# Patient Record
Sex: FEMALE | Race: WHITE
[De-identification: names, ages, dates, MRNs, and addresses within clinical notes are randomized per-mention and may not be internally consistent; named-entity substitution may affect disease eponyms.]

---

## 2021-07-22 ENCOUNTER — HOSPITAL ENCOUNTER (EMERGENCY)
Dept: HOSPITAL 41 - JD.ED | Age: 66
Discharge: HOME | End: 2021-07-22
Payer: COMMERCIAL

## 2021-07-22 DIAGNOSIS — E66.9: ICD-10-CM

## 2021-07-22 DIAGNOSIS — N39.0: Primary | ICD-10-CM

## 2021-07-22 DIAGNOSIS — Z79.899: ICD-10-CM

## 2021-07-22 DIAGNOSIS — I10: ICD-10-CM

## 2021-07-22 DIAGNOSIS — Z88.8: ICD-10-CM

## 2021-07-22 DIAGNOSIS — J45.909: ICD-10-CM

## 2021-07-22 DIAGNOSIS — Z91.048: ICD-10-CM

## 2021-07-22 PROCEDURE — 87186 SC STD MICRODIL/AGAR DIL: CPT

## 2021-07-22 PROCEDURE — 80053 COMPREHEN METABOLIC PANEL: CPT

## 2021-07-22 PROCEDURE — 85025 COMPLETE CBC W/AUTO DIFF WBC: CPT

## 2021-07-22 PROCEDURE — 86140 C-REACTIVE PROTEIN: CPT

## 2021-07-22 PROCEDURE — 96375 TX/PRO/DX INJ NEW DRUG ADDON: CPT

## 2021-07-22 PROCEDURE — 99284 EMERGENCY DEPT VISIT MOD MDM: CPT

## 2021-07-22 PROCEDURE — 87088 URINE BACTERIA CULTURE: CPT

## 2021-07-22 PROCEDURE — 87086 URINE CULTURE/COLONY COUNT: CPT

## 2021-07-22 PROCEDURE — 36415 COLL VENOUS BLD VENIPUNCTURE: CPT

## 2021-07-22 PROCEDURE — 81001 URINALYSIS AUTO W/SCOPE: CPT

## 2021-07-22 PROCEDURE — 96374 THER/PROPH/DIAG INJ IV PUSH: CPT

## 2021-07-22 PROCEDURE — 83690 ASSAY OF LIPASE: CPT

## 2021-07-22 PROCEDURE — 74176 CT ABD & PELVIS W/O CONTRAST: CPT

## 2021-07-22 NOTE — CT
CT abdomen and pelvis

 

Technique: Multiple axial sections were obtained from above the dome 

of the diaphragm inferiorly through the pubic symphysis.  Intravenous 

and oral contrast were not utilized.  Study has been performed as a 

ureteral stone protocol.

 

Comparison: No prior abdomen and pelvis imaging is available.

 

Findings: Multiple parapelvic cysts are seen within both kidneys, 

worse on the left side.  There are two nonobstructing calculi being 

seen within the left kidney with largest calculus measuring 7 mm.  Two

 small calculi are noted within the right kidney with largest 

measuring 4 mm.  No ureteral dilatation or ureteral stone is seen.  No

 bladder calculi are seen.

 

Visualized lung bases show nothing acute.

 

Numerous cysts are seen within the right and left lobes of the liver. 

 Largest cyst is located within the right lobe of the liver measuring 

7.1 cm.

 

Gallbladder contains no calcified gallstones.  Adrenal glands show no 

nodule.

 

Pancreas appears within normal limits.

 

Abdominal aorta shows atherosclerotic calcification which continues 

into the iliac arteries.  No aneurysm is seen.  No retroperitoneal 

adenopathy or mesenteric abnormalities are seen.  No pelvic mass or 

adenopathy is seen.

 

Appendix is not visualized with certainty. There is evidence of fecal 

material within distal small bowel loops in the right lower quadrant. 

 No bowel dilatation is seen in this area.

 

Bone window settings were reviewed which show degenerative change 

within the lumbar spine.  There is fusion at L5-S1 being seen with 

spondylolisthesis measuring 1.3 cm.

 

Impression:

1.  Numerous liver cysts as well as multiple parapelvic cysts within 

both kidneys.  Parapelvic cysts are more prominent within the left 

kidney.  Two nonobstructing calculi are seen within each kidney.  No 

ureteral dilatation or ureteral calculi are is seen.

2.  Other findings as noted above which are chronic.  Nothing acute is

 appreciated.

 

Diagnostic code #2

## 2021-07-22 NOTE — EDM.PDOC
ED HPI GENERAL MEDICAL PROBLEM





- General


Chief Complaint: Abdominal Pain


Stated Complaint: NAUSEA/ABDOMINAL PAIN


Time Seen by Provider: 21 17:12


Source of Information: Reports: Patient, RN Notes Reviewed


History Limitations: Reports: No Limitations





- History of Present Illness


INITIAL COMMENTS - FREE TEXT/NARRATIVE: 





Patient is a 65-year-old female presenting to the emergency department with 

complaints of right-sided flank and abdominal pain that started around 10 AM 

this morning.  She reports the pain is constant with occasional "cramping ".  

She is felt nauseous but has had no vomiting.  Denies diarrhea.  States that she

thought she could be constipated but did have 2 small bowel movements this 

morning.  She has had kidney stones in the past but states that this feels 

different than before.  Denies any dysuria or obvious hematuria.


  ** Right Lower Abdomen


Pain Score (Numeric/FACES): 2





- Related Data


                                    Allergies











Allergy/AdvReac Type Severity Reaction Status Date / Time


 


animal dander Allergy Severe Airway Verified 21 17:18





   Tightness  


 


tree and shrub pollen Allergy Severe Airway Verified 21 17:18





   Tightness  


 


lisinopril AdvReac Severe Cough Verified 21 17:18


 


Statins-Hmg-Coa Reductase AdvReac Severe Muscle Verified 21 17:18





Inhibitor   Aches  


 


hay fever Allergy Severe Airway Uncoded 21 17:18





   Tightness  











Home Meds: 


                                    Home Meds





Albuterol Sulfate [Proair Hfa] 1 - 2 puff INH Q4H PRN 10/21/20 [History]


Ascorbic Acid [Vitamin C] 250 mg PO DAILY 10/21/20 [History]


Ezetimibe [Zetia] 10 mg PO DAILY 10/21/20 [History]


Fish Oil/Omega-3 Fatty Acids [Fish Oil 1,000 MG] 500 mg PO Q48H 10/21/20 

[History]


Loratadine [Claritin] 10 mg PO DAILY PRN 10/21/20 [History]


Multivitamin 1 tab PO DAILY 10/21/20 [History]


Ubidecarenone [Coq-10] 100 mg PO DAILY 10/21/20 [History]


amLODIPine Besylate [Norvasc] 10 mg PO DAILY 10/21/20 [History]


Calcium Carbonate [Calcium] 250 mg PO DAILY 21 [History]


Cefdinir [Omnicef] 300 mg PO BID 7 Days #14 cap 21 [Rx]


L.acidoph,Paracasei, B.lactis [Probiotic] 1 each PO DAILY 21 [History]


Zolpidem Tartrate [Ambien] 5 mg PO BEDTIME PRN 21 [History]











Past Medical History


HEENT History: Reports: Hard of Hearing, Impaired Vision, Other (See Below)


Other HEENT History: TMJ with surgical intervention, wears glasses, has hearing 

aids


Cardiovascular History: Reports: High Cholesterol, Hypertension


Respiratory History: Reports: Asthma


Gastrointestinal History: Reports: Colon Polyp, GERD, Other (See Below)


Other Gastrointestinal History: esophagitis


Genitourinary History: Reports: UTI, Recurrent


Other Genitourinary History: UTI


OB/GYN History: Reports: None


Musculoskeletal History: Reports: Arthritis


Neurological History: Reports: None


Psychiatric History: Reports: None


Endocrine/Metabolic History: Reports: Obesity/BMI 30+


Hematologic History: Reports: None


Immunologic History: Reports: None


Oncologic (Cancer) History: Reports: None


Dermatologic History: Reports: None





- Past Surgical History


HEENT Surgical History: Reports: Naso-Sinus Surgery, Other (See Below)


Other HEENT Surgeries/Procedures: TMJ Surgery


GI Surgical History: Reports: Colonoscopy, EGD


Female  Surgical History: Reports:  Section, Hysterectomy, 

Oophorectomy


Musculoskeletal Surgical History: Reports: Shoulder Surgery





Social & Family History





- Tobacco Use


Tobacco Use Status *Q: Never Tobacco User





- Caffeine Use


Caffeine Use: Reports: Coffee





- Recreational Drug Use


Recreational Drug Use: No





ED ROS GENERAL





- Review of Systems


Review Of Systems: See Below


Constitutional: Reports: No Symptoms.  Denies: Fever, Chills


HEENT: Reports: No Symptoms


Respiratory: Reports: No Symptoms


Cardiovascular: Reports: No Symptoms


Endocrine: Reports: No Symptoms


GI/Abdominal: Reports: Abdominal Pain, Nausea.  Denies: Diarrhea, Vomiting


: Reports: No Symptoms.  Denies: Dysuria, Hematuria


Musculoskeletal: Reports: No Symptoms


Skin: Reports: No Symptoms


Neurological: Reports: No Symptoms


Psychiatric: Reports: No Symptoms


Hematologic/Lymphatic: Reports: No Symptoms


Immunologic: Reports: No Symptoms





ED EXAM, GI/ABD





- Physical Exam


Exam: See Below


Exam Limited By: No Limitations


General Appearance: Alert, WD/WN, No Apparent Distress


Respiratory/Chest: No Respiratory Distress, Lungs Clear, Normal Breath Sounds, 

No Accessory Muscle Use, Chest Non-Tender


Cardiovascular: Normal Peripheral Pulses, Regular Rate, Rhythm, No Edema, No 

Gallop, No JVD, No Murmur, No Rub


GI/Abdominal Exam: Normal Bowel Sounds, Soft, No Organomegaly, No Distention, No

 Abnormal Bruit, No Mass, Pelvis Stable, Tender (RLQ and right lateral abdomen)


Back Exam: Normal Inspection, Full Range of Motion, CVA Tenderness (R).  No: CVA

 Tenderness (L)


Neurological: Alert, Oriented, CN II-XII Intact, Normal Cognition, Normal Gait, 

Normal Reflexes, No Motor/Sensory Deficits


Psychiatric: Normal Affect, Normal Mood


Skin Exam: Warm, Dry, Intact, Normal Color, No Rash





Course





- Vital Signs


Last Recorded V/S: 





                                Last Vital Signs











Temp  96.9 F   21 17:14


 


Pulse  86   21 17:14


 


Resp  16   21 17:14


 


BP  161/88 H  21 17:14


 


Pulse Ox  96   21 17:14














- Orders/Labs/Meds


Orders: 





                               Active Orders 24 hr











 Category Date Time Status


 


 Peripheral IV Care [RC] .AS DIRECTED Care  21 17:29 Active


 


 CULTURE URINE [MREF] Stat Lab  21 17:40 Ordered


 


 Sodium Chloride 0.9% [Normal Saline] 1,000 ml Med  21 17:31 Active





 IV NOW   


 


 Sodium Chloride 0.9% [Saline Flush] Med  21 17:29 Active





 10 ml FLUSH ASDIRECTED PRN   


 


 Peripheral IV Insertion Adult [OM.PC] Stat Oth  21 17:29 Ordered








                                Medication Orders





Sodium Chloride (Normal Saline)  1,000 mls @ 150 mls/hr IV NOW STA


   Stop: 21 00:10


   Last Admin: 21 18:04  Dose: 150 mls/hr


   Documented by: JG


Sodium Chloride (Sodium Chloride 0.9% 10 Ml Syringe)  10 ml FLUSH ASDIRECTED PRN


   PRN Reason: Keep Vein Open


   Last Admin: 21 17:58  Dose: 10 ml


   Documented by: JG








Labs: 





                                Laboratory Tests











  21 Range/Units





  17:40 17:58 17:58 


 


WBC   9.96   (3.98-10.04)  K/mm3


 


RBC   5.10   (3.98-5.22)  M/mm3


 


Hgb   14.9   (11.2-15.7)  gm/dl


 


Hct   44.6   (34.1-44.9)  %


 


MCV   87.5   (79.4-94.8)  fl


 


MCH   29.2   (25.6-32.2)  pg


 


MCHC   33.4   (32.2-35.5)  g/dl


 


RDW Std Deviation   42.7   (36.4-46.3)  fL


 


Plt Count   368   (182-369)  K/mm3


 


MPV   9.5   (9.4-12.3)  fl


 


Neut % (Auto)   69.7   (34.0-71.1)  %


 


Lymph % (Auto)   21.5   (19.3-51.7)  %


 


Mono % (Auto)   7.2   (4.7-12.5)  %


 


Eos % (Auto)   1.3   (0.7-5.8)  


 


Baso % (Auto)   0.2   (0.1-1.2)  %


 


Neut # (Auto)   6.94 H   (1.56-6.13)  K/mm3


 


Lymph # (Auto)   2.14   (1.18-3.74)  K/mm3


 


Mono # (Auto)   0.72 H   (0.24-0.36)  K/mm3


 


Eos # (Auto)   0.13   (0.04-0.36)  K/mm3


 


Baso # (Auto)   0.02   (0.01-0.08)  K/mm3


 


Sodium    141  (136-145)  mEq/L


 


Potassium    4.1  (3.5-5.1)  mEq/L


 


Chloride    106  ()  mEq/L


 


Carbon Dioxide    25  (21-32)  mEq/L


 


Anion Gap    14.1  (5-15)  


 


BUN    19 H  (7-18)  mg/dL


 


Creatinine    1.2 H  (0.55-1.02)  mg/dL


 


Est Cr Clr Drug Dosing    39.28  mL/min


 


Estimated GFR (MDRD)    45  (>60)  mL/min


 


BUN/Creatinine Ratio    15.8  (14-18)  


 


Glucose    112 H  (70-99)  mg/dL


 


Calcium    9.2  (8.5-10.1)  mg/dL


 


Total Bilirubin    0.3  (0.2-1.0)  mg/dL


 


AST    14 L  (15-37)  U/L


 


ALT    22  (14-59)  U/L


 


Alkaline Phosphatase    68  ()  U/L


 


C-Reactive Protein    <0.2  (<1.0)  mg/dL


 


Total Protein    7.3  (6.4-8.2)  g/dl


 


Albumin    3.9  (3.4-5.0)  g/dl


 


Globulin    3.4  gm/dL


 


Albumin/Globulin Ratio    1.2  (1-2)  


 


Lipase    84  ()  U/L


 


Urine Color  Light yellow    (Yellow)  


 


Urine Appearance  Clear    (Clear)  


 


Urine pH  6.0    (5.0-8.0)  


 


Ur Specific Gravity  1.020    (1.005-1.030)  


 


Urine Protein  Negative    (Negative)  


 


Urine Glucose (UA)  Negative    (Negative)  


 


Urine Ketones  Negative    (Negative)  


 


Urine Occult Blood  Negative    (Negative)  


 


Urine Nitrite  Negative    (Negative)  


 


Urine Bilirubin  Negative    (Negative)  


 


Urine Urobilinogen  0.2    (0.2-1.0)  


 


Ur Leukocyte Esterase  2+ H    (Negative)  


 


Urine RBC  0-5    (0-5)  /hpf


 


Urine WBC  5-10 H    (0-5)  /hpf


 


Ur Epithelial Cells  0-5    (0-5)  /hpf


 


Urine Bacteria  Rare    (FEW)  /hpf


 


Urine Mucus  Rare    (FEW)  /hpf











Meds: 





Medications











Generic Name Dose Route Start Last Admin





  Trade Name Freq  PRN Reason Stop Dose Admin


 


Sodium Chloride  1,000 mls @ 150 mls/hr  21 17:31  21 18:04





  Normal Saline  IV  21 00:10  150 mls/hr





  NOW STA   Administration


 


Sodium Chloride  10 ml  21 17:29  21 17:58





  Sodium Chloride 0.9% 10 Ml Syringe  FLUSH   10 ml





  ASDIRECTED PRN   Administration





  Keep Vein Open  














Discontinued Medications














Generic Name Dose Route Start Last Admin





  Trade Name Freq  PRN Reason Stop Dose Admin


 


Hydromorphone HCl  0.5 mg  21 17:31  21 18:02





  Hydromorphone 0.5 Mg/0.5 Ml Syringe  IVPUSH  21 17:32  0.5 mg





  ONETIME ONE   Administration


 


Ondansetron HCl  4 mg  21 17:31  21 18:00





  Ondansetron 4 Mg/2 Ml Sdv  IVPUSH  21 17:32  4 mg





  ONETIME ONE   Administration














- Re-Assessments/Exams


Free Text/Narrative Re-Assessment/Exam: 


Patient is a 65-year-old female presenting to the emergency department with 

complaints of right lower quadrant and right lateral abdominal pain as well as 

right flank pain.  On exam, patient does have right lower quadrant and right 

lateral abdominal tenderness as well as right-sided flank pain.  Exam is 

suspicious for possible kidney stone.  I have ordered blood work, urinalysis, 

and a CT scan of the abdomen pelvis without contrast.  We will give her IV 

fluids, Dilaudid, and Zofran.





21 19:10


Hematology is grossly unremarkable.  Urinalysis shows 2+ leukocyte esterase and 

10-20 WBCs consistent with urinary tract infection.  CT scan shows numerous 

cysts as well as nonobstructing kidney stones but no ureteral dilatation or 

ureteral calculi.  Review of the CT does show increased stool in the area of her

 discomfort.  Patient will be treated for urinary tract infection with Omnicef. 

 This prescription will be sent to her pharmacy.  She will be sent home with a 

bottle of magnesium citrate.  Discussed return precautions.  Discharge 

instructions as documented.





Departure





- Departure


Time of Disposition: 19:10


Disposition: Home, Self-Care 01


Condition: Good


Clinical Impression: 


Urinary tract infection


Qualifiers:


 Urinary tract infection type: site unspecified Hematuria presence: without 

hematuria Qualified Code(s): N39.0 - Urinary tract infection, site not specified








- Discharge Information


*PRESCRIPTION DRUG MONITORING PROGRAM REVIEWED*: No


*COPY OF PRESCRIPTION DRUG MONITORING REPORT IN PATIENT KALIN: No


Prescriptions: 


Cefdinir [Omnicef] 300 mg PO BID 7 Days #14 cap


Instructions:  Urinary Tract Infection, Adult


Referrals: 


Zulema Martinez MD [Primary Care Provider] - 


Additional Instructions: 


You were seen in the emergency department today for right-sided abdominal and 

back pain.  Work-up included blood work, urinalysis, and a CT scan of your 

abdomen pelvis.  Blood work was found to be normal.  Urinalysis did show that 

you have a urinary tract infection.  CT scan showed increased stool in the right

side of your colon as well as numerous cysts in your liver and kidneys.  Was 

otherwise found to be normal.  You have been started on Omnicef for treatment of

urinary tract infection.  Take this medication as prescribed.  You been sent 

home with a bottle of magnesium citrate which is a laxative.  Recommend that you

drink that upon your arrival home.  This should produce a number of bowel 

movements, some of which would be loose.  If symptoms fail to improve or you 

develop any worsening symptoms, please not hesitate to return to the emergency 

department for reevaluation.





Sepsis Event Note (ED)





- Evaluation


Sepsis Screening Result: No Definite Risk





- Focused Exam


Vital Signs: 





                                   Vital Signs











  Temp Pulse Resp BP Pulse Ox


 


 21 17:14  96.9 F  86  16  161/88 H  96














- My Orders


Last 24 Hours: 





My Active Orders





21 17:29


Peripheral IV Care [RC] .AS DIRECTED 


Sodium Chloride 0.9% [Saline Flush]   10 ml FLUSH ASDIRECTED PRN 


Peripheral IV Insertion Adult [OM.PC] Stat 





21 17:31


Sodium Chloride 0.9% [Normal Saline] 1,000 ml IV NOW 





21 17:40


CULTURE URINE [MREF] Stat 














- Assessment/Plan


Last 24 Hours: 





My Active Orders





21 17:29


Peripheral IV Care [RC] .AS DIRECTED 


Sodium Chloride 0.9% [Saline Flush]   10 ml FLUSH ASDIRECTED PRN 


Peripheral IV Insertion Adult [OM.PC] Stat 





21 17:31


Sodium Chloride 0.9% [Normal Saline] 1,000 ml IV NOW 





21 17:40


CULTURE URINE [MREF] Stat

## 2022-01-17 ENCOUNTER — HOSPITAL ENCOUNTER (OUTPATIENT)
Dept: HOSPITAL 41 - JD.SDS | Age: 67
Discharge: HOME | End: 2022-01-17
Attending: ORTHOPAEDIC SURGERY
Payer: COMMERCIAL

## 2022-01-17 DIAGNOSIS — Z91.048: ICD-10-CM

## 2022-01-17 DIAGNOSIS — M06.9: ICD-10-CM

## 2022-01-17 DIAGNOSIS — I10: ICD-10-CM

## 2022-01-17 DIAGNOSIS — E78.00: ICD-10-CM

## 2022-01-17 DIAGNOSIS — Z79.899: ICD-10-CM

## 2022-01-17 DIAGNOSIS — K21.9: ICD-10-CM

## 2022-01-17 DIAGNOSIS — M75.101: ICD-10-CM

## 2022-01-17 DIAGNOSIS — M19.011: Primary | ICD-10-CM

## 2022-01-17 DIAGNOSIS — J45.20: ICD-10-CM

## 2022-01-17 DIAGNOSIS — Z98.890: ICD-10-CM

## 2022-01-17 DIAGNOSIS — Z88.8: ICD-10-CM

## 2022-01-17 DIAGNOSIS — D75.839: ICD-10-CM

## 2022-01-17 DIAGNOSIS — G47.33: ICD-10-CM

## 2022-01-17 PROCEDURE — 97161 PT EVAL LOW COMPLEX 20 MIN: CPT

## 2022-01-17 PROCEDURE — 23472 RECONSTRUCT SHOULDER JOINT: CPT

## 2022-01-17 PROCEDURE — 73020 X-RAY EXAM OF SHOULDER: CPT

## 2022-01-17 PROCEDURE — C1769 GUIDE WIRE: HCPCS

## 2022-01-17 PROCEDURE — C1713 ANCHOR/SCREW BN/BN,TIS/BN: HCPCS

## 2022-01-17 PROCEDURE — 76000 FLUOROSCOPY <1 HR PHYS/QHP: CPT

## 2022-01-17 PROCEDURE — C1776 JOINT DEVICE (IMPLANTABLE): HCPCS

## 2023-07-27 ENCOUNTER — HOSPITAL ENCOUNTER (EMERGENCY)
Dept: HOSPITAL 41 - JD.ED | Age: 68
LOS: 1 days | Discharge: NURSING, REHAB OR LONG-TERM CARE | End: 2023-07-28
Payer: MEDICARE

## 2023-07-27 DIAGNOSIS — Z79.82: ICD-10-CM

## 2023-07-27 DIAGNOSIS — Z87.891: ICD-10-CM

## 2023-07-27 DIAGNOSIS — S00.81XA: ICD-10-CM

## 2023-07-27 DIAGNOSIS — I10: ICD-10-CM
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
## 2023-07-27 DIAGNOSIS — Z79.899: ICD-10-CM

## 2023-07-27 DIAGNOSIS — S72.012A: Primary | ICD-10-CM

## 2023-07-27 DIAGNOSIS — Z88.8: ICD-10-CM

## 2023-07-27 DIAGNOSIS — J45.909: ICD-10-CM

## 2023-07-27 DIAGNOSIS — Z91.048: ICD-10-CM
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
## 2023-07-27 PROCEDURE — 96374 THER/PROPH/DIAG INJ IV PUSH: CPT

## 2023-07-27 PROCEDURE — 99285 EMERGENCY DEPT VISIT HI MDM: CPT

## 2023-07-27 PROCEDURE — 96375 TX/PRO/DX INJ NEW DRUG ADDON: CPT

## 2023-07-27 PROCEDURE — 72192 CT PELVIS W/O DYE: CPT

## 2023-07-27 PROCEDURE — 96376 TX/PRO/DX INJ SAME DRUG ADON: CPT

## 2023-07-27 PROCEDURE — 73502 X-RAY EXAM HIP UNI 2-3 VIEWS: CPT
